# Patient Record
Sex: FEMALE | Race: WHITE | Employment: PART TIME | URBAN - METROPOLITAN AREA
[De-identification: names, ages, dates, MRNs, and addresses within clinical notes are randomized per-mention and may not be internally consistent; named-entity substitution may affect disease eponyms.]

---

## 2018-09-07 ENCOUNTER — HOSPITAL ENCOUNTER (EMERGENCY)
Age: 38
Discharge: HOME | End: 2018-09-07
Payer: COMMERCIAL

## 2018-09-07 ENCOUNTER — APPOINTMENT (OUTPATIENT)
Dept: GENERAL RADIOLOGY | Age: 38
End: 2018-09-07
Payer: COMMERCIAL

## 2018-09-07 VITALS
SYSTOLIC BLOOD PRESSURE: 118 MMHG | OXYGEN SATURATION: 98 % | WEIGHT: 145 LBS | TEMPERATURE: 98.6 F | DIASTOLIC BLOOD PRESSURE: 83 MMHG | HEART RATE: 79 BPM | RESPIRATION RATE: 16 BRPM

## 2018-09-07 DIAGNOSIS — M54.2 NECK PAIN: Primary | ICD-10-CM

## 2018-09-07 PROCEDURE — 99212 OFFICE O/P EST SF 10 MIN: CPT

## 2018-09-07 PROCEDURE — 71101 X-RAY EXAM UNILAT RIBS/CHEST: CPT

## 2018-09-07 PROCEDURE — 72050 X-RAY EXAM NECK SPINE 4/5VWS: CPT

## 2018-09-07 RX ORDER — IBUPROFEN 600 MG/1
600 TABLET ORAL EVERY 6 HOURS PRN
Qty: 21 TABLET | Refills: 0 | Status: SHIPPED | OUTPATIENT
Start: 2018-09-07 | End: 2018-09-12

## 2018-09-07 RX ORDER — METHYLPREDNISOLONE 4 MG/1
TABLET ORAL
Qty: 21 TABLET | Status: SHIPPED | OUTPATIENT
Start: 2018-09-07 | End: 2018-09-13

## 2018-09-07 RX ORDER — CYCLOBENZAPRINE HCL 10 MG
10 TABLET ORAL 2 TIMES DAILY PRN
Qty: 10 TABLET | Refills: 0 | Status: SHIPPED | OUTPATIENT
Start: 2018-09-07 | End: 2018-09-12

## 2018-09-07 ASSESSMENT — PAIN DESCRIPTION - FREQUENCY: FREQUENCY: CONTINUOUS

## 2018-09-07 ASSESSMENT — PAIN DESCRIPTION - PAIN TYPE: TYPE: ACUTE PAIN

## 2018-09-07 ASSESSMENT — PAIN DESCRIPTION - LOCATION: LOCATION: NECK;BACK

## 2018-09-07 ASSESSMENT — PAIN DESCRIPTION - ORIENTATION: ORIENTATION: LEFT;MID

## 2018-09-07 ASSESSMENT — PAIN DESCRIPTION - PROGRESSION: CLINICAL_PROGRESSION: GRADUALLY WORSENING

## 2018-09-07 ASSESSMENT — PAIN SCALES - GENERAL: PAINLEVEL_OUTOF10: 10

## 2018-09-07 NOTE — ED PROVIDER NOTES
HPI:  9/7/18, Time: 9:41 AM         Kamryn Castellanos is a 40 y.o. female presenting to the ED for neck pain and pain in the left posterior rib area. She said she's had neck problems on and off over the years but never went to the doctor about I. t she said today this morning she said she was reaching for something in her refrigerator and developed pain in her left posterior chest with a spasming sensation. Also neck pain. States she does not have a fever has never had this evaluated. Has not taken anything for it. He said she's had muscle spasms like this before when she moves wrong the wrong way. She said last week she had one on the right side. Today it started on the left side. She's denying any numbness weakness or tingling in her upper extremities. Has normal strength in her bilateral upper extremities. Does not have a fever. Review of Systems:   Pertinent positives and negatives are stated within HPI, all other systems reviewed and are negative.          --------------------------------------------- PAST HISTORY ---------------------------------------------  Past Medical History:  has no past medical history on file. Past Surgical History:  has a past surgical history that includes Tubal ligation. Social History:  reports that she has never smoked. She has never used smokeless tobacco. She reports that she does not use drugs. Family History: family history is not on file. The patients home medications have been reviewed. Allergies: Patient has no known allergies. -------------------------------------------------- RESULTS -------------------------------------------------  All laboratory and radiology results have been personally reviewed by myself   LABS:  No results found for this visit on 09/07/18. RADIOLOGY:  Interpreted by Radiologist.  XR CERVICAL SPINE (4-5 VIEWS)   Final Result   1. No acute cervical spine fracture. 2. Mild multilevel cervical spondylosis. XR RIBS LEFT INCLUDE CHEST (MIN 3 VIEWS)   Final Result   1. No acute displaced rib fracture is identified. 2. No acute cardiopulmonary disease.             ------------------------- NURSING NOTES AND VITALS REVIEWED ---------------------------   The nursing notes within the ED encounter and vital signs as below have been reviewed. /83   Pulse 79   Temp 98.6 °F (37 °C) (Oral)   Resp 16   Wt 145 lb (65.8 kg)   LMP 08/02/2018   SpO2 98%   Oxygen Saturation Interpretation: Normal      ---------------------------------------------------PHYSICAL EXAM--------------------------------------      Constitutional/General: Alert and oriented x3, in mild distress due to spasming of left back  Head: Normocephalic and atraumatic  Eyes:conjunctiva clear  Mouth: Oropharynx clear, handling secretions, no trismus  Neck: Supple, full ROM,   Pulmonary: Lungs clear to auscultation bilaterally, no wheezes, rales, or rhonchi. Not in respiratory distress  Cardiovascular:  Regular rate and rhythm, no murmurs, gallops, or rubs. 2+ distal pulses  Abdomen: Soft, non tender, non distended,   Musculoskeletal.--She has full range of motion of her left shoulder. She spasming over the left posterior chest upper back to palpation. She cannot sit straight due to the spasms. Has some tenderness over the cervical spine to palpation. Range of motion of the shoulder without difficulty. 5+ strength in the bilateral upper extremities  Extremities: Moves all extremities x 4. Warm and well perfused  Skin: warm and dry without rash  Neurologic: GCS 15,  Psych: Normal Affect      ------------------------------ ED COURSE/MEDICAL DECISION MAKING----------------------  Medications - No data to display      ED COURSE:       Medical Decision Making:    Patient was spasms in the left upper back and neck.   She's not able to straighten up due to the spasming she said it happened suddenly when she reached for something in her refrigerator she went into spasm has not taken anything for this has had neck problems in the past and also has had spasms before in her upper back. I offered to give her some medication to help with that she did not want any medication here. I did x-ray the C-spine and also the left ribs and chest.    X-ray shows some spondylosis of the cervical spine but no fractures the chest and ribs were negative. I again talked to the patient about the some medication here and she did not want anything she said the spasming is starting to ease up she has improved movement. I did advise her that she needs to get an MRI of her cervical spine to see more fully what her problem is. Was started on Flexeril as needed for muscle spasms, ibuprofen 600 by mouth when necessary for pain and also Medrol Dosepak. She should follow-up with her doctor. Counseling: The emergency provider has spoken with the patient and discussed todays results, in addition to providing specific details for the plan of care and counseling regarding the diagnosis and prognosis. Questions are answered at this time and they are agreeable with the plan.      --------------------------------- IMPRESSION AND DISPOSITION ---------------------------------    IMPRESSION  1. Neck pain    2. Cervical spondylosis  3. Muscle Spasms    DISPOSITION  Disposition: Discharge to home  Patient condition is good      NOTE: This report was transcribed using voice recognition software.  Every effort was made to ensure accuracy; however, inadvertent computerized transcription errors may be present     CATIA Buckley - CRYSTAL  09/07/18 1026